# Patient Record
(demographics unavailable — no encounter records)

---

## 2018-05-02 NOTE — EDPHY
H & P


Time Seen by Provider: 05/02/18 20:42


HPI/ROS: 





CHIEF COMPLAINT:  Foreign body sensation throat





HISTORY OF PRESENT ILLNESS:  19-year-old female presents to the emergency 

department with a foreign body sensation to her throat.  Patient states last 

evening around 10:00 p.m. She was eating some ice cream that had chunks of 

Brownie in it and she felt like the Brownie got stuck in her throat.  She was 

able to drink and swallow normally.  She was able to sleep fine and then this 

morning she noted a feeling of a "lump" in her throat.  She is able to eat and 

drink.  She states the sensation has not gotten any worse, however it has not 

improved.  Patient has never had foreign body stuck in her throat in the past.





ROS:  Denies vomiting, chest pain, shortness of breath


Past Medical/Surgical History: 





Asthma


Social History: 





[employer] student from [place]


Smoking Status: Never smoked


Physical Exam: 





On examination the patient is in no respiratory distress.  Posterior pharynx is 

normal.  There is no erythema or swelling noted.  She is able to speak in full 

sentences.  Her voice sounds normal.  There is no muffled voice.  She is 

tolerating swallowing saliva.  She is tolerating p.o. Fluids.  She is in no 

respiratory distress.  Her neck is supple without lymphadenopathy.  No palpable 

crepitus.  Lungs are clear to auscultation in all fields with no wheezing, 

rhonchi or rales.  Heart regular rate rhythm without murmur.  No palpable lump 

in the anterior aspect of her throat palpated.


Constitutional: 


 Initial Vital Signs











Temperature (C)  37.2 C   05/02/18 20:35


 


Heart Rate  71   05/02/18 20:35


 


Respiratory Rate  18   05/02/18 20:35


 


Blood Pressure  129/82 H  05/02/18 20:35


 


O2 Sat (%)  95   05/02/18 20:35








 











O2 Delivery Mode               Room Air














Allergies/Adverse Reactions: 


 





No Known Allergies Allergy (Unverified 05/02/18 20:35)


 











MDM/Departure





- MDM


ED Course/Re-evaluation: 





19-year-old female presents to the emergency department with foreign body 

sensation in her throat.  The patient is in no apparent distress.  No visible 

foreign body noted.  I think she likely irritated her throat which caused a bit 

of localized swelling with this foreign body sensation.  I do not visualize a 

foreign body.  I do not think she needs emergent endoscopy as she is tolerating 

eating and drinking.  She was given GI referral per Dr. Jim Lagunas request.  

She will follow up that she still has persisting symptoms the next 2-3 days.





Case was discussed with Dr. Jim Lagunas, secondary supervising physician, who 

did not directly evaluate the patient but agrees with treatment and plan.





Patient is comfortable being discharged home.





- Depart


Disposition: Home, Routine, Self-Care


Clinical Impression: 


 Sensation of foreign body in throat





Pharyngitis


Qualifiers:


 Pharyngitis/tonsillitis etiology: unspecified etiology Qualified Code(s): 

J02.9 - Acute pharyngitis, unspecified





Condition: Good


Instructions:  Pharyngitis (ED), Esophageal Foreign Body (ED)


Additional Instructions: 


Anti-inflammatory such as ibuprofen 600 mg every 8 hr as needed for pain.  Soft 

foods until symptoms resolve.  Return to the emergency department if you 

developed difficulty swallowing, difficulty breathing, or any other concerns.


Referrals: 


Ruthy Rodriguez MD [Medical Doctor] - 2-3 days, if not improved (

Gastroenterologist on-call)